# Patient Record
(demographics unavailable — no encounter records)

---

## 2017-01-01 NOTE — NURSERY NURSING FLOWSHEET
=================================================================

 FS

=================================================================

Datetime Report Generated by CPN: 2017 10:46

   

   

=================================================================

Datetime: 2017 07:21

=================================================================

   

   

=================================================================

Environment

=================================================================

   

 Type:  Radiant Warmer (Lisa Andrew, RN)

 Skin Probe Reading (C):  35.0 (Lisa Andrew, RN)

 Warmer Control Setting (C):  34.6 (Lisa Andrew, RN)

   

=================================================================

Vital Signs

=================================================================

   

 Temperature (F):  98.4 (Lisa Morales, RN)

 Temperature (C):  36.9 (QS system process)

 Temperature Route:  Axillary (Lisa Morales, RN)

 Heart Rate:  160 (Lisa Andrew, RN)

 Respirations:  60 (Lisa Andrew, RN)

 Cuff BP: Sys/Sheri (Mean):  59 (Lisa Adnrew, RN)

:  35 (Lisa Andrew, RN)

:  45 (Lisa Adnrew, RN)

 Oxygen Saturation (%):  100 (Lisa Morales, RN)

 Pulse Ox Sensor Location:  Right Foot (Lisa Morales, RN)

   

=================================================================

Laboratory

=================================================================

   

 Bedside Blood Glucose:  46 L (QS system process)

   

=================================================================

Bonding/Interactions

=================================================================

   

 By:  Caregiver (Lisa Andrew, RN)

 Interactions:  Diaper Changed; Position Change; Talked To; Touched

   (Lisa Andrew, RN)

   

=================================================================

Pain Assessment (NIPS)

=================================================================

   

 Indication:  Initial Assessment (Lisa Andrew, RN)

 Facial Expression:  (0) Relaxed Muscles (Lisa Andrew, RN)

 Cry:  (0) No Cry (Lisa Andrew, RN)

 Breathing Pattern:  (0) Relaxed (Lisa Andrew, RN)

 Arms:  (0) Relaxed (Lisa Andrew, RN)

 Legs:  (0) Relaxed (Lisa Andrew, RN)

 State of Arousal:  (0) Sleeping/Awake, quiet (Lisa Andrew, RN)

 Total Score:  0 (QS system process)

 Interventions:  Boundaries; Quiet, Darkened Environment (Lisa Andrew,

   RN)

   

=================================================================

Datetime: 2017 06:45

=================================================================

   

 Oxygen Saturation (%):  99 (Eriak Schulz, RN)

   

=================================================================

Datetime: 2017 06:23

=================================================================

   

   

=================================================================

Laboratory

=================================================================

   

 Bedside Blood Glucose:  49 L (QS system process)

   

=================================================================

Datetime: 2017 06:00

=================================================================

   

   

=================================================================

Environment

=================================================================

   

 Type:  Radiant Warmer (Erika Pion, RN)

 Skin Probe Reading (C):  34.8 (Erika Pion, RN)

 Warmer Control Setting (C):  34.8 (Erika Pion, RN)

   

=================================================================

Vital Signs

=================================================================

   

 Temperature (F):  98.6 (Erika Pion, RN)

 Temperature (C):  37.0 (QS system process)

 Heart Rate:  144 (Erika Pion, RN)

 Respirations:  79 (Erika Pion, RN)

 Cuff BP: Sys/Sheri (Mean):  59 (Erika Pion, RN)

:  35 (Erika Pion, RN)

   

=================================================================

Oxygenation

=================================================================

   

 FiO2:  21 (Erika Pion, RN)

 O2 LPM:  2 (Erika Pion, RN)

 Oxygen Saturation (%):  99 (Erika Pion, RN)

   

=================================================================

Pain Assessment (NIPS)

=================================================================

   

 Indication:  Initial Assessment (Erika Pion, RN)

 Facial Expression:  (0) Relaxed Muscles (Erika Pion, RN)

 Cry:  (1) Mild, intermittent cry (Erika Pion, RN)

 Breathing Pattern:  (0) Relaxed (Erika Pion, RN)

 Arms:  (0) Relaxed (Erika Pion, RN)

 Legs:  (0) Relaxed (Erika Pion, RN)

 State of Arousal:  (0) Sleeping/Awake, quiet (Erika Pion, RN)

 Total Score:  1 (QS system process)

 Interventions:  Non Nutritive Sucking (Erika Pion, RN)

   

=================================================================

Datetime: 2017 05:00

=================================================================

   

   

=================================================================

Vital Signs

=================================================================

   

 Temperature (F):  99.0 (Erika Pion, RN)

 Temperature (C):  37.2 (QS system process)

 Heart Rate:  135 (Erika Pion, RN)

 Respirations:  86 (Erika Pion, RN)

 Cuff BP: Sys/Sheri (Mean):  62 (Erika Pion, RN)

:  25 (Erika Pion, RN)

:  40 (Erika Pion, RN)

   

=================================================================

Oxygenation

=================================================================

   

 FiO2:  21 (Erika Pion, RN)

 O2 LPM:  2 (Erika Pion, RN)

 Oxygen Saturation (%):  99 (Erika Pion, RN)

   

=================================================================

Datetime: 2017 04:47

=================================================================

   

   

=================================================================

Laboratory

=================================================================

   

 Bedside Blood Glucose:  39 LL (QS system process)

   

=================================================================

Datetime: 2017 04:05

=================================================================

   

   

=================================================================

Vital Signs

=================================================================

   

 Temperature (F):  99.1 (Annotations: decreased isolette temp to 35.8)

   (Erika Schulz RN)

 Temperature (C):  37.3 (QS system process)

 Temperature Route:  Axillary (Erika Schulz RN)

 Heart Rate:  158 (Erika Schulz RN)

 Respirations:  82 (Erika Schulz RN)

 Cuff BP: Sys/Sheri (Mean):  58 (Erika Schulz RN)

:  25 (Erika Schulz RN)

:  38 (Erika Schulz RN)

   

=================================================================

Oxygenation

=================================================================

   

 FiO2:  21 (Erika Pion, RN)

 O2 LPM:  2 (Erika Pion, RN)

 Oxygen Saturation (%):  97 (Erika Pion, RN)

 Pulse Ox Sensor Location:  Right Foot (Erika Pion, RN)

   

=================================================================

Pain Assessment (NIPS)

=================================================================

   

 Indication:  Initial Assessment (Erika Pion, RN)

 Facial Expression:  (0) Relaxed Muscles (Erika Pion, RN)

 Cry:  (1) Mild, intermittent cry (Erika Pion, RN)

 Breathing Pattern:  (0) Relaxed (Erika Pion, RN)

 Arms:  (0) Relaxed (Erika Pion, RN)

 Legs:  (0) Relaxed (Erika Pion, RN)

 State of Arousal:  (0) Sleeping/Awake, quiet (Erika Pion, RN)

 Total Score:  1 (QS system process)

 Interventions:  Boundaries; Quiet, Darkened Environment (Erika Pion,

   RN)

   

=================================================================

Datetime: 2017 04:01

=================================================================

   

   

=================================================================

Laboratory

=================================================================

   

 Bedside Blood Glucose:  56 L (Annotations: post bolus blood sugar)

   (Erika Schulz, RN)

   

=================================================================

Datetime: 2017 04:00

=================================================================

   

 Heart Rate:  150 (Lizzie Pierce, RN)

 Respirations:  40 (Lizzie Pierce, RN)

   

=================================================================

Oxygenation

=================================================================

   

 FiO2:  21 (Lizzie Pierce, RN)

 O2 LPM:  2 (Lizzie Pierce, RN)

 Oxygen Saturation (%):  95 (Lizzie Pierce, RN)

 Pulse Ox Sensor Location:  Right Foot (Lizzie Kari, RN)

   

=================================================================

Datetime: 2017 03:44

=================================================================

   

   

=================================================================

Feedings

=================================================================

   

 Lactation Consult:  Needs (Leticia Errichiello, RN)

 Wt Change Since Birth (gm):  10 (QS system process)

   

=================================================================

Datetime: 2017 03:30

=================================================================

   

 Heart Rate:  180 (Lizzie Kari, RN)

 Respirations:  51 (Lizzie [place], RN)

   

=================================================================

Oxygenation

=================================================================

   

 FiO2:  21 (Lizzie Pierce, RN)

 O2 LPM:  2 (Lizzie [place], RN)

 Oxygen Saturation (%):  92 (Lizzie Kari, RN)

 Pulse Ox Sensor Location:  Right Foot (Lizzie Mullenfer, RN)

   

=================================================================

Datetime: 2017 03:25

=================================================================

   

   

=================================================================

Laboratory

=================================================================

   

 Bedside Blood Glucose:  37 LL (Annotations: Serum Glucose Drawn) (QS

   system process)

   

=================================================================

Datetime: 2017 03:00

=================================================================

   

 Heart Rate:  170 (Lizzie [place], RN)

 Respirations:  35 (Lizzie Kari, RN)

   

=================================================================

Oxygenation

=================================================================

   

 FiO2:  21 (Lizzie [place], RN)

 O2 LPM:  2 (Lizzie Kari, RN)

 Oxygen Saturation (%):  96 (Lizzie [place], RN)

 Pulse Ox Sensor Location:  Right Foot (Lizzie Kari, RN)

   

=================================================================

Datetime: 2017 02:43

=================================================================

   

   

=================================================================

Laboratory

=================================================================

   

 Bedside Blood Glucose:  47 L (QS system process)

   

=================================================================

Datetime: 2017 02:25

=================================================================

   

 Heart Rate:  171 (Lizzie [place], RN)

 Respirations:  80 (Lizzie Kari, RN)

 Cuff BP: Sys/Sheri (Mean):  58 (Lizzie Kari, RN)

:  33 (Lizzie [place], RN)

:  41 (Lizzie Kari, RN)

   

=================================================================

Oxygenation

=================================================================

   

 FiO2:  80 (Lizzie [place], RN)

 O2 LPM:  4 (Lizzie [place], RN)

 Oxygen Saturation (%):  99 (Lizzie Kari, RN)

 Pulse Ox Sensor Location:  Right Foot (Lizzie Kari, RN)

   

=================================================================

Datetime: 2017 02:20

=================================================================

   

 Heart Rate:  169 (Lizzie Kari, RN)

 Respirations:  68 (Lizzie [place], RN)

   

=================================================================

Oxygenation

=================================================================

   

 FiO2:  100 (Lizzie Pierce, RN)

 O2 LPM:  4 (Lizzie Kari, RN)

 Oxygen Saturation (%):  99 (Lizzie Kari, RN)

 Pulse Ox Sensor Location:  Right Foot (Lizzie [place], RN)

   

=================================================================

Datetime: 2017 02:01

=================================================================

   

   

=================================================================

Laboratory

=================================================================

   

 Bedside Blood Glucose:  74 (QS system process)

   

=================================================================

Datetime: 2017 02:00

=================================================================

   

 Heart Rate:  186 (Lizzie Kari, RN)

 Respirations:  41 (Lizzie [place], RN)

   

=================================================================

Oxygenation

=================================================================

   

 FiO2:  100 (Lizzie [place], RN)

 O2 LPM:  4 (Lizzie [place], RN)

 Oxygen Saturation (%):  85 (Lizzie [place], RN)

 Pulse Ox Sensor Location:  Right Hand (Lizzie Kari, RN)

   

=================================================================

Datetime: 2017 01:50

=================================================================

   

   

=================================================================

Laboratory

=================================================================

   

 Bedside Blood Glucose:  78 (QS system process)

   

=================================================================

Datetime: 2017 01:45

=================================================================

   

   

=================================================================

Environment

=================================================================

   

 Type:  Radiant Warmer (Lizzie Pierce RN)

 Skin Probe Reading (C):  36.5 (Lizzie Pierce RN)

 Warmer Control Setting (C):  36.5 (Lizzie Pierce RN)

   

=================================================================

Security

=================================================================

   

 Infant ID Bands Confirmed:  Mother (Lizzie Pierce BRICE)

 Second ID Band Lopez:  Father (Lizzie Pierce RN)

 ID Band Location:  Left Leg; Left Arm

   (Annotations: [id number]) (Lizzie Pierce RN)

   

=================================================================

Vital Signs

=================================================================

   

 Temperature (F):  98.5 (Lizzie Pierce RN)

 Temperature (C):  36.9 (QS system process)

 Heart Rate:  176 (Lizzie Pierce RN)

 Respirations:  45 (Lizzie Pierce RN)

 Cuff BP: Sys/Sheri (Mean):  83 (Lizzie Pierce RN)

:  45 (Lizzie Pierce RN)

:  63 (Lizzie Pierce RN)

 Blood Pressure Location:  Right Leg (Lizzie Pierce RN)

   

=================================================================

Oxygenation

=================================================================

   

 FiO2:  100 (Lizzie Pierce RN)

 O2 LPM:  10 (Lizzie Pierce RN)

 Oxygen Saturation (%):  74 (Lizzie Pierce RN)

 Pulse Ox Sensor Location:  Right Hand (Lizzie Pierce RN)

   

=================================================================

Stool

=================================================================

   

 First Stool:  Yes (Lizzie Pierce RN)

   

=================================================================

Procedures

=================================================================

   

 Vitamin K Injection IM:  1 mg IM Given; Left Thigh (iLzzie Pierce RN)

 Erythromycin Eye Ointment:  Given Both Eyes (Annotations: given at

   0305) (Lizzie Pierce RN)

 Hepatitis B Vaccine Given:  2017 00:00 (Lizzie Pierce RN)

   

=================================================================

Measurements

=================================================================

   

 Weight (gm):  2362 (Erika Schulz RN)

 Weight (lb/oz):  5 (QS system process)

:  3 (QS system process)

 Length (cm):  50.75 (Lizzie Pierce RN)

 Length (in):  19.98 (QS system process)

 Head Circumference (cm):  31.50 (Lizzie Pierce RN)

 Head Circumference (in):  12.40 (QS system process)

 Chest Circumference (cm):  30.00 (Lizzie Pierce RN)

 Abdominal Circumference (cm):  30.00 (Lizzie Pierce RN)

## 2017-01-01 NOTE — NURSERY CARE PLAN
=================================================================

NB Care Plan

=================================================================

Datetime Report Generated by CPN: 2017 10:46

   

   

=================================================================

Datetime: 2017 07:15

=================================================================

   

   

=================================================================

Thermoregulation

=================================================================

   

 State:  Risk For (Lisa Morales RN)

 Nursing Diagnosis:  Ineffective Thermoregulation (Lisa Morales RN)

 Related To:  Birth (Lisa Morales RN)

 Goal(s):  Infant's Temperature will be Maintained and Supported in a

   Neutral Thermal Environment (Lisa Morales RN)

 Interventions:  Assess Temperature as Indicated and Continue to

   Monitor Temperature per Protocol; Maintain a Neutral Thermal

   Environment; Describe and Promote Skin/Skin Contact with

   Parent/Caregiver; Bathe Under Radiant Warmer When Temperature is in

   the Acceptable Range as Tolerated; Avoid using Cool Instruments for

   Assessments.  Avoid Placing Infant on Cool Surfaces or in Drafts;

   After Temperature Stabilization Dress Infant, Wrap in Blankets and

   Transition to Open Crib. Monitor Temperature per Protocol and Return

   Infant to Warmer if Needed; Educate Parent/Caregiver about need for

   Warmth, Keeping Head Covered and Warming Equipment Used (Lisa Morales RN)

 Outcome:  Temperature within Expected Range (Lisa Morales RN)

   Status:  Ongoing (Lisa Morales RN)

   Status:  Ongoing (Lisa Morales RN)

   

=================================================================

Pain

=================================================================

   

 State:  Risk For (Lisa Morales RN)

 Related To:  Treatment and Procedures (Lisa Morales RN)

 Goal(s):  Infants Pain will be Assessed and Managed (Lisa Morales RN)

 Interventions:  Assess for Signs of Pain per Policy and During and

   After Procedure; Provide a Pacifier or Other Non-Pharmacologic

   Method of Comfort as Needed; Administer Medication as Ordered;

   Assess Heels for Signs of Injury; Warm the Heel for 5 to 10 Minutes

   Before Heel Stick; Coordinate Care and Testing to Avoid Unnecessary

   Heel Sticks; Evaluate Therapeutic Effectiveness of Medication and

   Treatments (Lisa Morales, BRICE)

 Outcome:  Free From Pain and Discomfort (Lisa Morales RN)

   Status:  Ongoing (Lisa Morales RN)

 Outcome:  Pain will be Controlled During Procedures (Lisa Morales RN)

   Status:  Ongoing (Lisa Morales RN)

 Outcome:  Sleep Without Disturbance (Lisa Morales RN)

   Status:  Ongoing (Lisa Morales RN)

   

=================================================================

Infection

=================================================================

   

 State:  Risk For (Lisa Morales RN)

 Related To:  Break in Skin Integrity (Lisa Morales RN)

 Goal(s):  Infant will be Free of Infection with Vital Signs and

   Laboratory Results within Expected Range (Lisa Morales RN)

 Interventions:  Ensure Staff and Visitors Follow Hand Washing and

   Scrub-in Protocol; Monitor Vital Signs; Assess for Signs of

   Infection: Temperature Instability, Feeding Problems, Lethargy,

   Pallor, Apnea or Diarrhea; Assess Cord at Diaper Change; Review

   Maternal Records for History of Infections and Treatments;

   Administer Intravenous Fluids as Ordered and Assess Intravenous

   Site(s) Hourly; Administer Medications as Ordered; Monitor Intake

   and Output; Obtain Daily Weight; Explain to Parent/Caregiver: Hand

   Washing, Avoid Exposing Infant to People with Infections, How and

   When to Take Infants Temperature (iLsa Morales RN)

 Outcome:  Vital Signs Within Expected Range for Gestation (Lisa Morales RN)

   Status:  Ongoing (Lisa Morales RN)

 Outcome:  Sites of Invasive Procedures or Broken Skin will Show no

   Signs of Infection (Lisa Morales RN)

   Status:  Ongoing (Lisa Morales RN)

 Outcome:  Infant will Receive Prophylactic Eye Ointment (Lisa Morales RN)

   Status:  Ongoing (Lisa Morales RN)

   

=================================================================

Parenting Impaired

=================================================================

   

 State:  Risk For (Lisa Morales RN)

 Related To:  Maternal Substance Abuse (Lisa Morales RN)

 Goal(s):  Infant will Experience Appropriate Parenting;

   Parent/Caregiver will Maintain Support for One Another;

   Parent/Caregiver will Adapt to Disruption Caused by Treatments (Lisa Morales RN)

 Interventions:  Assess Parent/Caregiver Interactions with Each Other

   and Infant; Assess Parent/Caregiver Understanding of Infant's

   Condition and Provide Accurate Information about Condition,

   Treatment and Prognosis; Observe and Encourage Parent/Caregiver and

   Infant Attachment and Bonding Activities and Provide Feedback;

   Provide a Safe Non-judgmental Environment for Parent/Caregiver to

   Discuss Concerns; Promote Family Cohesiveness by Encouraging

   Discussion and Problem Solving; Assess Parent/Caregiver

   Understanding and Provide Teaching of Parenting Skills (Lisa Morales RN)

 Outcome:  Parent/Caregiver will Verbalize Feelings Associated with

   Disruption of Interaction (Lisa Morales RN)

   Status:  Ongoing (Lisa Morales RN)

 Outcome:  Parent/Caregiver will Discuss Their Fears and the

   Possibility of Difficulties with Parenting (Lisa Morales RN)

   Status:  Ongoing (Lisa Morales RN)

 Outcome:  Parent/Caregiver will Exhibit Appropriate Bonding Behaviors

   (Lisa Morales RN)

   Status:  Ongoing (Lisa Morales RN)

   

=================================================================

Knowledge Deficit

=================================================================

   

 State:  Risk For (Lisa Morales RN)

 Related To:  Birth (Lisa Morales RN)

 Goal(s):  Discharge home with parents. (Lisa Morales RN)

 Interventions:  Assess Motivation and Willingness of Family to Learn;

   Assess Parents Preferred Learning Mode: One to One Instruction,

   Reading, Videos, Group Discussion or Demonstration; Assess Barriers

   to Learning: Pain, Emotional State, Language Barrier, Cognitive

   Impairment, Visual or Hearing Deficits; Assess Parents and Family

   Knowledge of Disease Process, Medications and Treatment; Discuss

   Therapy and/or Treatment Options, Describe Rationale Behind

   Management, Therapy and Treatment Recommendations; Instruct Parents

   and Family on Signs and Symptoms to Report; Instruct Parents and

   Family on Medication Effects and Side Effects; Provide Appropriate

   and Timely Education Using Multiple Techniques; Give Clear and

   Thorough Explanations and Demonstrations (Lisa Morales RN)

 Outcome:  Parents provide care independently. (Lisa Morales RN)

   Status:  Ongoing (Lisa Morales RN)

   

=================================================================

Datetime: 2017 01:33

=================================================================

   

   

=================================================================

Respiratory Status

=================================================================

   

 State:  Risk For (Lizzie Pierce RN)

 Nursing Diagnosis:  Ineffective Airway Clearance (Lizzie Pierce RN)

 Related To:  Secretions (Lizzie Pierce RN)

 Goal(s):  Infant will Experience a Clear Airway and an Effective

   Breathing Pattern (Lizzie Pierce RN)

 Interventions:  Suction Mouth then Nares with Bulb Syringe and Repeat

   as Needed; Assess Respiratory Rate and Effort,  Nasal Flaring,

   Grunting or Retractions; Auscultate Breath Sounds and Apical Pulse;

   Monitor for Episodes of Increased Secretions; Teach Parent/Caregiver

   How to Use Bulb Syringe (Lizzie Pierce RN)

 Outcome:  Infant will Maintain a Respiratory Rate Within Expected

   Range (Lizzie Pierce RN)

   Status:  Ongoing (Lizzie Pierce RN)

 Outcome:  Infant will have Clear Bilateral Breath Sounds (Lizzie Pierce RN)

   Status:  Ongoing (Lizzie Pierce RN)

   

=================================================================

Thermoregulation

=================================================================

   

 State:  Risk For (Lizzie Pierce RN)

 Nursing Diagnosis:  Ineffective Thermoregulation (Lizzie Pierce RN)

 Related To:  Birth (Lizzie Pierce RN)

 Goal(s):  Infant's Temperature will be Maintained and Supported in a

   Neutral Thermal Environment (Lizzie Pierce RN)

 Interventions:  Assess Temperature as Indicated and Continue to

   Monitor Temperature per Protocol; Maintain a Neutral Thermal

   Environment; Describe and Promote Skin/Skin Contact with

   Parent/Caregiver; Bathe Under Radiant Warmer When Temperature is in

   the Acceptable Range as Tolerated; Avoid using Cool Instruments for

   Assessments.  Avoid Placing Infant on Cool Surfaces or in Drafts;

   After Temperature Stabilization Dress Infant, Wrap in Blankets and

   Transition to Open Crib. Monitor Temperature per Protocol and Return

   Infant to Warmer if Needed; Educate Parent/Caregiver about need for

   Warmth, Keeping Head Covered and Warming Equipment Used (Lizzie Pierce RN)

 Outcome:  Temperature within Expected Range (Lizzie Pierce RN)

   Status:  Ongoing (Lizzie Pierce RN)

   Status:  Ongoing (Lizzie Pierce RN)

   

=================================================================

Pain

=================================================================

   

 State:  Risk For (Lizzie Pierce RN)

 Related To:  Treatment and Procedures (Lizzie Pierce RN)

 Goal(s):  Infants Pain will be Assessed and Managed (Lizzie Pierce RN)

 Interventions:  Assess for Signs of Pain per Policy and During and

   After Procedure; Provide a Pacifier or Other Non-Pharmacologic

   Method of Comfort as Needed; Administer Medication as Ordered;

   Assess Heels for Signs of Injury; Warm the Heel for 5 to 10 Minutes

   Before Heel Stick; Coordinate Care and Testing to Avoid Unnecessary

   Heel Sticks; Evaluate Therapeutic Effectiveness of Medication and

   Treatments (Lizzie Pierce RN)

 Outcome:  Free From Pain and Discomfort (Lizzie Pierce RN)

   Status:  Ongoing (Lizzie Pierce RN)

 Outcome:  Pain will be Controlled During Procedures (Lizzie Pierce RN)

   Status:  Ongoing (Lizzie Pierce RN)

 Outcome:  Sleep Without Disturbance (Lizzie Pierce RN)

   Status:  Ongoing (Lizzie Pierce RN)

   

=================================================================

Infection

=================================================================

   

 State:  Risk For (Lizzie Pierce RN)

 Related To:  Break in Skin Integrity (Lizzie Pierce RN)

 Goal(s):  Infant will be Free of Infection with Vital Signs and

   Laboratory Results within Expected Range (Lizzie Pierce RN)

 Interventions:  Ensure Staff and Visitors Follow Hand Washing and

   Scrub-in Protocol; Monitor Vital Signs; Assess for Signs of

   Infection: Temperature Instability, Feeding Problems, Lethargy,

   Pallor, Apnea or Diarrhea; Assess Cord at Diaper Change; Review

   Maternal Records for History of Infections and Treatments;

   Administer Intravenous Fluids as Ordered and Assess Intravenous

   Site(s) Hourly; Administer Medications as Ordered; Monitor Intake

   and Output; Obtain Daily Weight; Explain to Parent/Caregiver: Hand

   Washing, Avoid Exposing Infant to People with Infections, How and

   When to Take Infants Temperature (Lizzie Pierce RN)

 Outcome:  Vital Signs Within Expected Range for Gestation (Lizzie Pierce RN)

   Status:  Ongoing (Lizzie Pierce RN)

 Outcome:  Sites of Invasive Procedures or Broken Skin will Show no

   Signs of Infection (Lizzie Pierce RN)

   Status:  Ongoing (Lizzie Pierce RN)

 Outcome:  Infant will Receive Prophylactic Eye Ointment (Lizzie Pierce RN)

   Status:  Ongoing (Lizzie Pierce RN)

   

=================================================================

Parenting Impaired

=================================================================

   

 State:  Risk For (Lizzie Pierce RN)

 Related To:  Maternal Substance Abuse (Lizzie Pierce RN)

 Goal(s):  Infant will Experience Appropriate Parenting;

   Parent/Caregiver will Maintain Support for One Another;

   Parent/Caregiver will Adapt to Disruption Caused by Treatments (Lizzie Pierce RN)

 Interventions:  Assess Parent/Caregiver Interactions with Each Other

   and Infant; Assess Parent/Caregiver Understanding of Infant's

   Condition and Provide Accurate Information about Condition,

   Treatment and Prognosis; Observe and Encourage Parent/Caregiver and

   Infant Attachment and Bonding Activities and Provide Feedback;

   Provide a Safe Non-judgmental Environment for Parent/Caregiver to

   Discuss Concerns; Promote Family Cohesiveness by Encouraging

   Discussion and Problem Solving; Assess Parent/Caregiver

   Understanding and Provide Teaching of Parenting Skills (Lizzie Pierce RN)

 Outcome:  Parent/Caregiver will Verbalize Feelings Associated with

   Disruption of Interaction (Lizzie Pierce RN)

   Status:  Ongoing (Lizzie Piecre RN)

 Outcome:  Parent/Caregiver will Discuss Their Fears and the

   Possibility of Difficulties with Parenting (Lizzie Pierce RN)

   Status:  Ongoing (Lizzie Pierce RN)

 Outcome:  Parent/Caregiver will Exhibit Appropriate Bonding Behaviors

   (Lizzie Pierce RN)

   Status:  Ongoing (Lizzie Pierce RN)

   

=================================================================

Knowledge Deficit

=================================================================

   

 State:  Risk For (Lizzie Pierce RN)

 Related To:  Birth (Lizzie Pierce, BRICE)

 Goal(s):  Discharge home with parents. (Lizzie Pierce RN)

 Interventions:  Assess Motivation and Willingness of Family to Learn;

   Assess Parents Preferred Learning Mode: One to One Instruction,

   Reading, Videos, Group Discussion or Demonstration; Assess Barriers

   to Learning: Pain, Emotional State, Language Barrier, Cognitive

   Impairment, Visual or Hearing Deficits; Assess Parents and Family

   Knowledge of Disease Process, Medications and Treatment; Discuss

   Therapy and/or Treatment Options, Describe Rationale Behind

   Management, Therapy and Treatment Recommendations; Instruct Parents

   and Family on Signs and Symptoms to Report; Instruct Parents and

   Family on Medication Effects and Side Effects; Provide Appropriate

   and Timely Education Using Multiple Techniques; Give Clear and

   Thorough Explanations and Demonstrations (Lizzie Pierce RN)

 Outcome:  Parents provide care independently. (Lizzie Pierce, BRICE)

   Status:  Ongoing (Lizzie Pierce RN)

## 2017-01-01 NOTE — NURSERY ADMISSION NURSING DOC
=================================================================

 Adm

=================================================================

Datetime Report Generated by CPN: 2017 10:46

   

   

=================================================================

Admission Information

=================================================================

   

 Admit To:   Intensive Care Nursery    (2017 01:45:Lizzie Pierce RN)

 Admission Date/Time:  2017 01:33    (2017 01:45:Lizzie Pierce RN)

 Admitted From:  Operating Room    (2017 01:45:Lizzie Pierce RN)

   

=================================================================

Measurements

=================================================================

   

 Weight (gm):  2362    (2017 01:45:Erika Pion, RN)

 Weight (lb/oz):  5    (2017 01:45:QS system process)

:  3    (2017 01:45:QS system process)

 Length (cm):  50.75    (2017 01:45:Lizzie Pierce RN)

 Length (in):  19.98    (2017 01:45:QS system process)

 Head Circumference (cm):  31.50    (2017 01:45:Lizzie Pierce RN)

 Head Circumference (in):  12.40    (2017 01:45:QS system process)

 Chest Circumference (cm):  30.00    (2017 01:45:Lizzie Pierce RN)

 Abdominal Circumference (cm):  30.00    (2017 01:45:Lizzie Pierce RN)

   

=================================================================

Infant Security

=================================================================

   

 Infant ID Bands Confirmed:  Mother    (2017 01:45:Lizzie Pierce RN)

 Second ID Band Lopez:  Father    (2017 01:45:Lizzie Pierce RN)

 ID Band Location:  Left Leg; Left Arm

   (Annotations: [id number])    (2017 01:45:Lizzie Pierce RN)

   

=================================================================

Environment

=================================================================

   

 Type:  Radiant Warmer    (2017 07:21:Lisa Morales RN)

 Type:  Radiant Warmer    (2017 06:00:Erika Schulz RN)

 Type:  Radiant Warmer    (2017 01:45:Lizzie Pierce RN)

 Skin Probe Reading (C):  35.0    (2017 07:21:Lisa Morales RN)

 Skin Probe Reading (C):  34.8    (2017 06:00:Erika Schulz RN)

 Skin Probe Reading (C):  36.5    (2017 01:45:Lizzie Pierce RN)

 Warmer Control Setting (C):  34.6    (2017 07:21:Lisa Morales RN)

 Warmer Control Setting (C):  34.8    (2017 06:00:Erika Schulz RN)

 Warmer Control Setting (C):  36.5    (2017 01:45:Lizzie Pierce RN)

   

=================================================================

Vital Signs

=================================================================

   

 Temperature (F):  98.4    (2017 07:21:Lisa Morales RN)

 Temperature (F):  98.6    (2017 06:00:Erika Schulz RN)

 Temperature (F):  99.0    (2017 05:00:Erika Schulz RN)

 Temperature (F):  99.1 (Annotations: decreased isolette temp to 35.8) 

   (2017 04:05:Erika Schulz RN)

 Temperature (F):  98.5    (2017 01:45:Lizzie Pierce RN)

 Temperature (C):  36.9    (2017 07:21:QS system process)

 Temperature (C):  37.0    (2017 06:00:QS system process)

 Temperature (C):  37.2    (2017 05:00:QS system process)

 Temperature (C):  37.3    (2017 04:05:QS system process)

 Temperature (C):  36.9    (2017 01:45:QS system process)

 Temperature Route:  Axillary    (2017 07:21:Lisa Morales RN)

 Temperature Route:  Axillary    (2017 04:05:Erika Schulz RN)

 Heart Rate:  160    (2017 07:21:Lisa Morales RN)

 Heart Rate:  144    (2017 06:00:Erika Schulz RN)

 Heart Rate:  135    (2017 05:00:Erika Schulz RN)

 Heart Rate:  158    (2017 04:05:Erika Schulz RN)

 Heart Rate:  150    (2017 04:00:Lizzie Pierce RN)

 Heart Rate:  180    (2017 03:30:Lizzie Pierce RN)

 Heart Rate:  170    (2017 03:00:Lizzie Pierce RN)

 Heart Rate:  171    (2017 02:25:Lizzie Pierce RN)

 Heart Rate:  169    (2017 02:20:Lizzie Pierce RN)

 Heart Rate:  186    (2017 02:00:Lizzie Pierce RN)

 Heart Rate:  176    (2017 01:45:Lizzie Pierce RN)

 Respirations:  60    (2017 07:21:Lisa Morales RN)

 Respirations:  79    (2017 06:00:Erika Schulz RN)

 Respirations:  86    (2017 05:00:Erika Schulz RN)

 Respirations:  82    (2017 04:05:Erika Schulz RN)

 Respirations:  40    (2017 04:00:Lizzie Pierce RN)

 Respirations:  51    (2017 03:30:Lizzie Pierce RN)

 Respirations:  35    (2017 03:00:Lizzie Peirce RN)

 Respirations:  80    (2017 02:25:Lizzie Pierce RN)

 Respirations:  68    (2017 02:20:Lizzie Pierce RN)

 Respirations:  41    (2017 02:00:Lizzie Pierce RN)

 Respirations:  45    (2017 01:45:Lizzie Pierce RN)

 Cuff BP:  Sys/Sheri/Mean:  59    (2017 07:21:Lisa Morales RN)

 Cuff BP:  Sys/Sheri/Mean:  59    (2017 06:00:Erika Schulz RN)

 Cuff BP:  Sys/Sheri/Mean:  62    (2017 05:00:Erika Schulz RN)

 Cuff BP:  Sys/Sheri/Mean:  58    (2017 04:05:Erika Schulz RN)

 Cuff BP:  Sys/Sheri/Mean:  58    (2017 02:25:Lizzie Pierce RN)

 Cuff BP:  Sys/Sheri/Mean:  83    (2017 01:45:Lizzie Pierce RN)

:  35    (2017 07:21:Lisa Morales RN)

:  35    (2017 06:00:Erika Schulz RN)

:  25    (2017 05:00:Erika Schulz RN)

:  25    (2017 04:05:Erika Schulz RN)

:  33    (2017 02:25:Lizzie Pierce RN)

:  45    (2017 01:45:Lizzie Pierce RN)

:  45    (2017 07:21:Lisa Morales RN)

:  40    (2017 05:00:Erika Schulz RN)

:  38    (2017 04:05:Erika Schulz RN)

:  41    (2017 02:25:Lizzie Pierce RN)

:  63    (2017 01:45:Lizzie Pierce RN)

 Blood Pressure Location:  Right Leg    (2017 01:45:Lizzie Pierce RN)

   

=================================================================

Oxygenation

=================================================================

   

 Supplemental O2 (L/min):  2    (2017 06:00:Erika Schulz RN)

 Supplemental O2 (L/min):  2    (2017 05:00:Erika Schulz RN)

 Supplemental O2 (L/min):  2    (2017 04:05:Erika Schulz RN)

 Supplemental O2 (L/min):  2    (2017 04:00:Lizzie Pierce RN)

 Supplemental O2 (L/min):  2    (2017 03:30:Lizzie Pierce RN)

 Supplemental O2 (L/min):  2    (2017 03:00:Lizzie Pierce RN)

 Supplemental O2 (L/min):  4    (2017 02:25:Lizzie Pierce RN)

 Supplemental O2 (L/min):  4    (2017 02:20:Lizzie Pierce RN)

 Supplemental O2 (L/min):  4    (2017 02:00:Lizzie Pierce RN)

 Supplemental O2 (L/min):  10    (2017 01:45:Lizzie Pierce RN)

 Oxygen Saturation (%):  100    (2017 07:21:Lisa Morales RN)

 Oxygen Saturation (%):  99    (2017 06:45:Erika Schulz RN)

 Oxygen Saturation (%):  99    (2017 06:00:Erika Schulz RN)

 Oxygen Saturation (%):  99    (2017 05:00:Erika Schulz RN)

 Oxygen Saturation (%):  97    (2017 04:05:Erika Schulz RN)

 Oxygen Saturation (%):  95    (2017 04:00:Lizzie Pierce RN)

 Oxygen Saturation (%):  92    (2017 03:30:Lizzie Pierce RN)

 Oxygen Saturation (%):  96    (2017 03:00:Lizzie Pierce RN)

 Oxygen Saturation (%):  99    (2017 02:25:Lizzie Pierce RN)

 Oxygen Saturation (%):  99    (2017 02:20:Lizzie Pierce RN)

 Oxygen Saturation (%):  85    (2017 02:00:Lizzie Pierce RN)

 Oxygen Saturation (%):  74    (2017 01:45:Lizzie Pierce RN)

   

=================================================================

Labs/Admission Routines

=================================================================

   

 Bedside Blood Glucose:  46 L    (2017 07:21:QS system process)

 Bedside Blood Glucose:  49 L    (2017 06:23:QS system process)

 Bedside Blood Glucose:  39 LL    (2017 04:47:QS system process)

 Bedside Blood Glucose:  56 L (Annotations: post bolus blood sugar)   

   (2017 04:01:Erika Schulz RN)

 Bedside Blood Glucose:  37 LL (Annotations: Serum Glucose Drawn)   

   (2017 03:25:QS system process)

 Bedside Blood Glucose:  47 L    (2017 02:43:QS system process)

 Bedside Blood Glucose:  74    (2017 02:01:QS system process)

 Bedside Blood Glucose:  78    (2017 01:50:QS system process)

 Erythromycin Eye Ointment:  Given Both Eyes (Annotations: given at

   0305)    (2017 01:45:Lizzie Pierce RN)

 Vitamin K Injection:  1 mg IM Given; Left Thigh    (2017

   01:45:Lizzie Pierce RN)

 Hepatitis B Vaccine Given:  2017 00:00    (2017 01:45:Lizzie Pierce RN)

   

=================================================================

Outputs

=================================================================

   

 First Stool:  Yes    (2017 01:45:Lizzie Pierce RN)

   

=================================================================

NIPS Pain Assessment

=================================================================

   

 Indication:  Initial Assessment    (2017 07:21:Lisa Morales RN)

 Indication:  Initial Assessment    (2017 06:00:Erika Schulz RN)

 Indication:  Initial Assessment    (2017 04:05:Erika Schulz RN)

 Facial Expression:  (0) Relaxed Muscles    (2017 07:21:Lisa

   Andrew, RN)

 Facial Expression:  (0) Relaxed Muscles    (2017 06:00:Erika Schulz RN)

 Facial Expression:  (0) Relaxed Muscles    (2017 04:05:Erika Schulz, RN)

 Cry:  (0) No Cry    (2017 07:21:Lisa Morales RN)

 Cry:  (1) Mild, intermittent cry    (2017 06:00:Erika Schulz RN)

 Cry:  (1) Mild, intermittent cry    (2017 04:05:Erika Schulz RN)

 Breathing Pattern:  (0) Relaxed    (2017 07:21:Lisa Morales RN)

 Breathing Pattern:  (0) Relaxed    (2017 06:00:Erika Schulz RN)

 Breathing Pattern:  (0) Relaxed    (2017 04:05:Erika Schulz RN)

 Arms:  (0) Relaxed    (2017 07:21:Lisa Morales RN)

 Arms:  (0) Relaxed    (2017 06:00:Erika Schulz RN)

 Arms:  (0) Relaxed    (2017 04:05:Erika Schulz RN)

 Legs:  (0) Relaxed    (2017 07:21:Lisa Morales RN)

 Legs:  (0) Relaxed    (2017 06:00:Erika Schulz RN)

 Legs:  (0) Relaxed    (2017 04:05:Erika Schulz RN)

 State of arousal:  (0) Sleeping/Awake, quiet    (2017 07:21:Lisa Morales RN)

 State of arousal:  (0) Sleeping/Awake, quiet    (2017

   06:00:Erika Schulz RN)

 State of arousal:  (0) Sleeping/Awake, quiet    (2017

   04:05:Erika Schuzl RN)

 Score:  0    (2017 07:21:QS system process)

 Score:  1    (2017 06:00:QS system process)

 Score:  1    (2017 04:05:QS system process)

 Interventions:  Boundaries; Quiet, Darkened Environment    (2017

   07:21:Lisa Morales RN)

 Interventions:  Non Nutritive Sucking    (2017 06:00:Erika Schulz RN)

 Interventions:  Boundaries; Quiet, Darkened Environment    (2017

   04:05:Erika Schulz RN)

## 2017-01-01 NOTE — NICU PROCEDURES NURSING DOC
=================================================================

NICU Proc

=================================================================

Datetime Report Generated by CPN: 2017 10:46

   

   

=================================================================

Datetime: 2017 00:19

=================================================================

   

Procedures:  [id number] (QS system process)

## 2017-01-01 NOTE — NURSERY NURSING DISCHARGE DOC
=================================================================

NB Discharge

=================================================================

Datetime Report Generated by CPN: 2017 10:46

   

   

=================================================================

Discharge Checklist

=================================================================

   

 Hepatitis B Vaccine Given:  2017 00:00    (2017 01:45:Lizzie Pierce, RN)

 Lactation Consult Done:  Needs    (2017 03:44:Leticia

   Errichiello, RN)

   

=================================================================

Bilirubin

=================================================================

   

Discharge Comments:  [id number]    (2017 00:19:QS system process)